# Patient Record
Sex: MALE | Race: ASIAN
[De-identification: names, ages, dates, MRNs, and addresses within clinical notes are randomized per-mention and may not be internally consistent; named-entity substitution may affect disease eponyms.]

---

## 2020-11-09 ENCOUNTER — HOSPITAL ENCOUNTER (EMERGENCY)
Dept: HOSPITAL 72 - EMR | Age: 35
Discharge: HOME | End: 2020-11-09
Payer: COMMERCIAL

## 2020-11-09 VITALS — WEIGHT: 150 LBS | BODY MASS INDEX: 24.11 KG/M2 | HEIGHT: 66 IN

## 2020-11-09 VITALS — SYSTOLIC BLOOD PRESSURE: 142 MMHG | DIASTOLIC BLOOD PRESSURE: 90 MMHG

## 2020-11-09 VITALS — DIASTOLIC BLOOD PRESSURE: 93 MMHG | SYSTOLIC BLOOD PRESSURE: 151 MMHG
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
## 2020-11-09 DIAGNOSIS — S61.412A: Primary | ICD-10-CM
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
## 2020-11-09 DIAGNOSIS — Z23: ICD-10-CM

## 2020-11-09 PROCEDURE — 90471 IMMUNIZATION ADMIN: CPT

## 2020-11-09 PROCEDURE — 90715 TDAP VACCINE 7 YRS/> IM: CPT

## 2020-11-09 PROCEDURE — 99283 EMERGENCY DEPT VISIT LOW MDM: CPT

## 2020-11-09 NOTE — NUR
ER DISCHARGE NOTE:

Patient is cleared to be discharged per ERMD, pt is aox4, on room air, with 
stable vital signs. pt was given dc and prescription instructions, pt was able 
to verbalize understanding, pt id band removed. pt is able to ambulate with 
steady gait. pt took all belongings. Gauze, Sling and splint applied to R arm. 
Pt educated regarding laceration care and f/u and prescriptions.

## 2020-11-09 NOTE — EMERGENCY ROOM REPORT
History of Present Illness


General


Chief Complaint:  Laceration


Source:  Patient





Present Illness


HPI


35-year-old male presents to the emergency department complaining of 6 out of 10

severity pain localized to the left hand x1 hour.  Patient reports he was using 

an angle  when he accidentally/unintentionally hit his left hand.  

Patient reports he is right-hand dominant.  Patient denies taking blood thinning

medications.  He is not sure when his last tetanus vaccine was.  Patient denies 

paresthesias or loss of gross motor movements of the affected extremity.  No 

other aggravating or relieving factors.  Patient states has not taken any 

medications prior to arrival.


Allergies:  


Coded Allergies:  


     No Known Allergies (Unverified , 11/9/20)





COVID-19 Screening


Contact w/high risk pt:  No


Experienced COVID-19 symptoms?:  No


COVID-19 Testing performed PTA:  No





Patient History


Past Medical History:  see triage record


Past Surgical History:  none


Pertinent Family History:  none


Reviewed Nursing Documentation:  PMH: Agreed; PSxH: Agreed





Nursing Documentation-PMH


Past Medical History:  No Stated History





Review of Systems


All Other Systems:  negative except mentioned in HPI





Physical Exam





Vital Signs








  Date Time  Temp Pulse Resp B/P (MAP) Pulse Ox O2 Delivery O2 Flow Rate FiO2


 


11/9/20 12:13 97.5 122 16 151/93 (112) 100 Room Air  








Sp02 EP Interpretation:  reviewed, normal


General Appearance:  no apparent distress, alert, GCS 15, non-toxic


Head:  normocephalic, atraumatic


Eyes:  bilateral eye normal inspection, bilateral eye PERRL


ENT:  hearing grossly normal, normal voice


Neck:  full range of motion


Respiratory:  chest non-tender, lungs clear, normal breath sounds, speaking full

sentences


Cardiovascular #1:  regular rate, rhythm, normal capillary refill


Musculoskeletal:  back normal, normal range of motion, gait/station normal, non-

tender


Neurologic:  alert, motor strength/tone normal, oriented x3, sensory intact, 

responsive, speech normal


Psychiatric:  judgement/insight normal


Skin:  laceration - Dorsal Left hand laceration approx  2 cm in length


Lymphatic:  no adenopathy





Procedures


Laceration/Wound Repair


Laceration/Wound Repair :  


   Consent:  Verbal


   Wound Location:  upper extremity - Left hand


   Wound's Depth, Shape:  linear


   Wound Length (cm):  2


   Wound Explored:  clean


   Irrigated w/ Saline (ccs):  200


   Anesthesia:  Lidocaine w/ Epi


   Volume Anesthetic (ccs):  2


   Wound Repaired With:  sutures


   Suture Size/Type:  4:0


   Number of Sutures:  3


   Layer Closure?:  No


   Sterile Dressing Applied?:  Yes


   Splint Applied?:  Yes - Left hand splint


   Type of Splint Applied:  volar left hand splint


   Sling Applied?:  Yes


   Patient Tolerated:  Well


   Complications:  None





Medical Decision Making


PA Attestation


Dr. Robb is my supervising physician whom patient care and management has 

been discussed with.


Diagnostic Impression:  


   Primary Impression:  


   Laceration


ER Course


35-year-old male presents to the emergency department complaining of 6 out of 10

severity pain localized to the left hand x1 hour.  Patient reports he was using 

an angle  when he accidentally/unintentionally hit his left hand.  

Patient reports he is right-hand dominant.  Patient denies taking blood thinning

medications.  He is not sure when his last tetanus vaccine was.  Patient denies 

paresthesias or loss of gross motor movements of the affected extremity.  No 

other aggravating or relieving factors.  Patient states has not taken any 

medications prior to arrival.





Ddx considered but are not limited to laceration, tendon injury, cellulitis, 

amputation





Vital signs: are WNL, pt. is afebrile.





H&PE are most consistent with:  Dorsal Left hand laceration approx  2 cm in 

length





ORDERS: none required at this time, the diagnosis is clinical





ED INTERVENTIONS: 


- Tetanus vaccine was administered as pt. vaccination status was  unknown.


- The wound was copiously irrigated with normal saline, and explored for foreign

body for which no FB  was found. 


- pt. is anesthetized with 1%lidocaine w. epi. 2cc


- The wound was approximated and closed using 3 interrupted 4.0 Ethilon sutures.


- Bacitracin and sterile dressing is applied.


--- Initially pt. did not want splint. however he kept using and moving his left

hand that the wound began bleeding with the sutures intact. d/w pt. that the 

splint is necessary.   Direct pressure was applied until hemostasis was reached.




-Left Hand Splint applied  by ED RN. Pt. remains neurovascularly intact.


- Left arm Sling applied  by ED RN. Pt. remains neurovascularly intact.





Discussed with patient:  That we make every effort to approximate the laceration

as best as we can so that scarring will be as cosmetically pleasing as possible 

with our limited cosmetic skill set in the Emergency dept.  Regardless of our 

best efforts there will be scarring after laceration repair.  The extent of 

scarring is unknown at this time.  





DISCHARGE: At this time pt. is stable for d/c to home. Will provide printed 

patient care instructions, and any necessary prescriptions. Care plan and follow

up instructions have been discussed with the patient prior to discharge.





Last Vital Signs








  Date Time  Temp Pulse Resp B/P (MAP) Pulse Ox O2 Delivery O2 Flow Rate FiO2


 


11/9/20 12:18 97.5 122 16 151/93 100 Room Air  








Status:  improved


Disposition:  HOME, SELF-CARE


Condition:  Stable


Scripts


Bacitracin Zinc Micronized (BACITRACIN ZINC) 1 Gm Powder


1 APPLIC TOPIC BID, #28.3 GM


   Prov: Anum Venegas         11/9/20 


Cephalexin* (KEFLEX*) 500 Mg Capsule


500 MG ORAL EVERY 12 HOURS for 7 Days, #14 CAP 0 Refills


   Prov: Anum Venegas         11/9/20


Patient Instructions:  Laceration Care, Adult





Additional Instructions:  


** SUTURES TO BE REMOVED IN 10 DAYS **








LIMITED USE OF LEFT HAND TO PROMOTE PROPER HEALING.











Anum Venegas               Nov 9, 2020 13:15

## 2020-11-09 NOTE — NUR
ED Nurse Note:



pt ambulated to ed c/o left dorsal laceration s/p extracurricular activities 
with saw and wood choppiong. site actively bleeding. pt reports he has not had 
any shots since he was 20 denies TDAP vaccine.

## 2020-11-09 NOTE — NUR
ED Nurse Note:

Pt was playing with sutures and began bleeding from site. PA notified. Pt 
educated about wound care. Site redressed.